# Patient Record
Sex: MALE | Race: WHITE | NOT HISPANIC OR LATINO | Employment: OTHER | URBAN - METROPOLITAN AREA
[De-identification: names, ages, dates, MRNs, and addresses within clinical notes are randomized per-mention and may not be internally consistent; named-entity substitution may affect disease eponyms.]

---

## 2020-11-23 ENCOUNTER — APPOINTMENT (OUTPATIENT)
Dept: CT IMAGING | Facility: CLINIC | Age: 85
End: 2020-11-23
Attending: EMERGENCY MEDICINE
Payer: MEDICARE

## 2020-11-23 ENCOUNTER — HOSPITAL ENCOUNTER (EMERGENCY)
Facility: CLINIC | Age: 85
Discharge: HOME | End: 2020-11-23
Attending: EMERGENCY MEDICINE | Admitting: EMERGENCY MEDICINE
Payer: MEDICARE

## 2020-11-23 VITALS
RESPIRATION RATE: 16 BRPM | TEMPERATURE: 97.5 F | OXYGEN SATURATION: 99 % | DIASTOLIC BLOOD PRESSURE: 77 MMHG | HEART RATE: 55 BPM | WEIGHT: 190 LBS | SYSTOLIC BLOOD PRESSURE: 156 MMHG

## 2020-11-23 DIAGNOSIS — R07.89 CHEST WALL PAIN: ICD-10-CM

## 2020-11-23 LAB
ANION GAP SERPL CALCULATED.3IONS-SCNC: 4 MMOL/L (ref 3–14)
BASOPHILS # BLD AUTO: 0 10E9/L (ref 0–0.2)
BASOPHILS NFR BLD AUTO: 0.4 %
BUN SERPL-MCNC: 23 MG/DL (ref 7–30)
CALCIUM SERPL-MCNC: 8.4 MG/DL (ref 8.5–10.1)
CHLORIDE SERPL-SCNC: 101 MMOL/L (ref 94–109)
CO2 SERPL-SCNC: 31 MMOL/L (ref 20–32)
CREAT SERPL-MCNC: 1.37 MG/DL (ref 0.66–1.25)
D DIMER PPP FEU-MCNC: 0.6 UG/ML FEU (ref 0–0.5)
DIFFERENTIAL METHOD BLD: ABNORMAL
EOSINOPHIL # BLD AUTO: 0.1 10E9/L (ref 0–0.7)
EOSINOPHIL NFR BLD AUTO: 2.7 %
ERYTHROCYTE [DISTWIDTH] IN BLOOD BY AUTOMATED COUNT: 12.6 % (ref 10–15)
GFR SERPL CREATININE-BSD FRML MDRD: 47 ML/MIN/{1.73_M2}
GLUCOSE SERPL-MCNC: 103 MG/DL (ref 70–99)
HCT VFR BLD AUTO: 35.8 % (ref 40–53)
HGB BLD-MCNC: 12.1 G/DL (ref 13.3–17.7)
IMM GRANULOCYTES # BLD: 0 10E9/L (ref 0–0.4)
IMM GRANULOCYTES NFR BLD: 0.4 %
INTERPRETATION ECG - MUSE: NORMAL
LYMPHOCYTES # BLD AUTO: 0.7 10E9/L (ref 0.8–5.3)
LYMPHOCYTES NFR BLD AUTO: 14.8 %
MCH RBC QN AUTO: 31.4 PG (ref 26.5–33)
MCHC RBC AUTO-ENTMCNC: 33.8 G/DL (ref 31.5–36.5)
MCV RBC AUTO: 93 FL (ref 78–100)
MONOCYTES # BLD AUTO: 0.5 10E9/L (ref 0–1.3)
MONOCYTES NFR BLD AUTO: 9.7 %
NEUTROPHILS # BLD AUTO: 3.5 10E9/L (ref 1.6–8.3)
NEUTROPHILS NFR BLD AUTO: 72 %
NRBC # BLD AUTO: 0 10*3/UL
NRBC BLD AUTO-RTO: 0 /100
PLATELET # BLD AUTO: 226 10E9/L (ref 150–450)
POTASSIUM SERPL-SCNC: 3.6 MMOL/L (ref 3.4–5.3)
RBC # BLD AUTO: 3.85 10E12/L (ref 4.4–5.9)
SODIUM SERPL-SCNC: 136 MMOL/L (ref 133–144)
TROPONIN I SERPL-MCNC: <0.015 UG/L (ref 0–0.04)
WBC # BLD AUTO: 4.9 10E9/L (ref 4–11)

## 2020-11-23 PROCEDURE — 250N000009 HC RX 250: Performed by: EMERGENCY MEDICINE

## 2020-11-23 PROCEDURE — 93005 ELECTROCARDIOGRAM TRACING: CPT

## 2020-11-23 PROCEDURE — 85379 FIBRIN DEGRADATION QUANT: CPT | Performed by: EMERGENCY MEDICINE

## 2020-11-23 PROCEDURE — 250N000011 HC RX IP 250 OP 636: Performed by: EMERGENCY MEDICINE

## 2020-11-23 PROCEDURE — 71275 CT ANGIOGRAPHY CHEST: CPT

## 2020-11-23 PROCEDURE — 99285 EMERGENCY DEPT VISIT HI MDM: CPT | Mod: 25

## 2020-11-23 PROCEDURE — 80048 BASIC METABOLIC PNL TOTAL CA: CPT | Performed by: EMERGENCY MEDICINE

## 2020-11-23 PROCEDURE — 85025 COMPLETE CBC W/AUTO DIFF WBC: CPT | Performed by: EMERGENCY MEDICINE

## 2020-11-23 PROCEDURE — 84484 ASSAY OF TROPONIN QUANT: CPT | Performed by: EMERGENCY MEDICINE

## 2020-11-23 RX ORDER — IOPAMIDOL 755 MG/ML
500 INJECTION, SOLUTION INTRAVASCULAR ONCE
Status: COMPLETED | OUTPATIENT
Start: 2020-11-23 | End: 2020-11-23

## 2020-11-23 RX ADMIN — IOPAMIDOL 65 ML: 755 INJECTION, SOLUTION INTRAVENOUS at 10:51

## 2020-11-23 RX ADMIN — SODIUM CHLORIDE 85 ML: 9 INJECTION, SOLUTION INTRAVENOUS at 10:51

## 2020-11-23 ASSESSMENT — ENCOUNTER SYMPTOMS
MYALGIAS: 0
FEVER: 0
BACK PAIN: 1
CHILLS: 0

## 2020-11-23 NOTE — ED AVS SNAPSHOT
[facility] Emergency Dept  [address]  [facility] [address]  Phone: [phone]  Fax: [phone]                                    Mark Salas   MRN: [id number]    Department: [facility] Emergency Dept   Date of Visit: 11/23/2020           After Visit Summary Signature Page    I have received my discharge instructions, and my questions have been answered. I have discussed any challenges I see with this plan with the nurse or doctor.    ..........................................................................................................................................  Patient/Patient Representative Signature      ..........................................................................................................................................  Patient Representative Print Name and Relationship to Patient    ..................................................               ................................................  Date                                   Time    ..........................................................................................................................................  Reviewed by Signature/Title    ...................................................              ..............................................  Date                                               Time          22EPIC Rev 08/18

## 2020-11-23 NOTE — ED TRIAGE NOTES
Patient initially reported body aches, now reports right sided chest pain that started a week ago. Also with arm and leg pain that started 2 weeks ago.

## 2020-11-23 NOTE — ED PROVIDER NOTES
History   Chief Complaint:  Chest and back pain     The history is provided by the patient.      Mark Salas is a 85 year old male who presents for evaluation of chest, right upper back, and elbow pain.  He notes that for at least 10 years he has had a vague right upper chest pain that he notices predominantly at night.  The pain is not changed with respiration, movement, cough, etc.  He does 40 push-ups at the end of the evening every night and goes to bed, and occasionally experiences 2-3 out of 10 pain at the site.  In the last week, he has had the pain migrated to his scapular area, to be worse approximately 4-5 out of 10.  It remains unchanged by movement or breathing.  This morning he noticed the pain radiating to his right elbow and family recommended he come in.  He denies fevers, chills, body aches, or any other concerns.    Allergies:  No Known Allergies    Medications:   Amlodipine  Lisinopril  Atenolol  Chlorthalidone   Ativan     Past Medical History:    Right knee degenerative joint disease   Hypertension  Chronic Kidney Disease   depression   Chronic insomnia   Malignant neoplasm of prostate   Primary osteoarthritis   Bilateral sensorineural hearing loss   Polymyalgia rheumatica     Past Surgical History:    Hydrocele excision   Skin biopsy    Family History:    Prostate cancer  Depression  Heart disease   Sudden cardiac death-brother 40s     Social History:  The patient was unaccompanied to the ED.  Smoking Status: Never Smoker  Smokeless Tobacco: Never Used  Alcohol Use: Yes    Review of Systems   Constitutional: Negative for chills and fever.   Cardiovascular: Positive for chest pain.   Musculoskeletal: Positive for back pain. Negative for myalgias.   All other systems reviewed and are negative.    Physical Exam     Patient Vitals for the past 24 hrs:   BP Temp Temp src Pulse Resp SpO2 Weight   11/23/20 1130 (!) 174/82 -- -- 56 -- 97 % --   11/23/20 1105 (!) 168/89 -- -- 63 -- 94 % --    11/23/20 1050 -- -- -- 54 -- 90 % --   11/23/20 1045 (!) 158/67 -- -- 59 -- 90 % --   11/23/20 1030 (!) 155/68 -- -- 60 -- 94 % --   11/23/20 1015 (!) 155/69 -- -- 59 14 95 % --   11/23/20 1000 (!) 156/73 -- -- 58 11 96 % --   11/23/20 0945 (!) 169/79 -- -- 63 -- 97 % --   11/23/20 0932 (!) 182/83 97.5  F (36.4  C) Oral 64 16 98 % 86.2 kg (190 lb)       Physical Exam  Constitutional:  Alert, attentive  HENT:    Nose: Nose normal.    Mouth/Throat: Oropharynx is clear, mucous membranes are moist  Eyes:    EOM are normal.  CV:    regular rate and rhythm; no murmurs, rubs or gallups. 2+ radial and ulnar pulses to the bilateral upper extremities   Chest:   Effort normal and breath sounds normal.   GI:     There is no tenderness. No distension. Normal bowel sounds  Neurological:  5/5 strength to the radian, ulnar and median motor distributions;      sensation intact to light touch to the radian, ulnar and median distributions  MSK:   Normal inspection to the right chest wall, back, and upper extremity; no swelling, erythema, warmth, or focal tenderness identified; normal range of motion  Skin:    Skin is warm and dry.       Emergency Department Course   ECG:  ECG taken at 0942, ECG read at 0944  Sinus bradycardia  Otherwise normal ECG  Rate 59 bpm. ID interval 182 ms. QRS duration 102 ms. QT/QTc 428/423 ms. P-R-T axes 63 54 65.    Imaging:  Radiology findings were communicated with the patient who voiced understanding of the findings.    CT Chest Pulmonary embolism w Contrast  IMPRESSION: No evidence for pulmonary embolism. No definite acute abnormality on CT.   Reading per radiology     Laboratory:  Laboratory findings were communicated with the patient who voiced understanding of the findings.    CBC: WBC 4.9, HGB 12.1(L),   BMP: glucose 103, Creatinine 1.37(H), GFR estimate 47(L), calcium 8.4(L) o/w WNL   Ddimer: 0.6(H)  Troponin (Collected 0955): <0.015     Emergency Department Course:  Nursing notes and  vitals reviewed.    0950 I performed an exam of the patient as documented above.     1108 I rechecked the patient. Explained findings to the Patient.    1231 I returned to update the patient.     Findings and plan explained to the Patient. Patient discharged home with instructions regarding supportive care, medications, and reasons to return. The importance of close follow-up was reviewed.     Impression & Plan      Medical Decision Making:  Mark Salas is a 85 year old male with longstanding history of right chest pain, worse in the last week, who presents for evaluation.  Physical exam reveals no significant musculoskeletal abnormality to explain symptoms there is no rash to suggest zoster.  Given longstanding and atypical pain, his negative EKG and troponin essentially rule out AMI.  D-dimer is elevated but CT chest shows no acute pathology.  There is no abdominal pain or nausea to suggest biliary colic and this is quite superior to right upper quadrant.  On recheck, the patient is resting comfortably.  Advise close follow-up given the unclear nature of his pain.  He does routinely do push-ups with this could represent pectoralis strain or pain.  Plan primary care follow-up in 2 to 3 days and strict return precautions for worse pain, shortness of breath, or any other concerns.    Diagnosis:    ICD-10-CM    1. Chest wall pain  R07.89        Disposition:   discharged to home    Scribe Disclosure:  I, Catina Dee, am serving as a scribe at 9:49 AM on 11/23/2020 to document services personally performed by Mark Lee MD based on my observations and the provider's statements to me.   [facility] EMERGENCY DEPARTMENT       Mark Lee MD  11/23/20 3974

## 2023-04-12 ENCOUNTER — APPOINTMENT (OUTPATIENT)
Dept: CT IMAGING | Facility: CLINIC | Age: 88
End: 2023-04-12
Attending: EMERGENCY MEDICINE
Payer: COMMERCIAL

## 2023-04-12 ENCOUNTER — HOSPITAL ENCOUNTER (EMERGENCY)
Facility: CLINIC | Age: 88
Discharge: HOME | End: 2023-04-12
Attending: EMERGENCY MEDICINE | Admitting: EMERGENCY MEDICINE
Payer: COMMERCIAL

## 2023-04-12 ENCOUNTER — APPOINTMENT (OUTPATIENT)
Dept: ULTRASOUND IMAGING | Facility: CLINIC | Age: 88
End: 2023-04-12
Attending: EMERGENCY MEDICINE
Payer: COMMERCIAL

## 2023-04-12 VITALS
RESPIRATION RATE: 23 BRPM | WEIGHT: 193.2 LBS | SYSTOLIC BLOOD PRESSURE: 131 MMHG | HEART RATE: 61 BPM | DIASTOLIC BLOOD PRESSURE: 69 MMHG | TEMPERATURE: 97.7 F | OXYGEN SATURATION: 96 %

## 2023-04-12 DIAGNOSIS — I48.91 ATRIAL FIBRILLATION, NEW ONSET (H): ICD-10-CM

## 2023-04-12 DIAGNOSIS — K86.2 PANCREATIC CYST: ICD-10-CM

## 2023-04-12 DIAGNOSIS — R10.13 ABDOMINAL PAIN, EPIGASTRIC: ICD-10-CM

## 2023-04-12 LAB
ALBUMIN SERPL BCG-MCNC: 4.1 G/DL (ref 3.5–5.2)
ALBUMIN UR-MCNC: 50 MG/DL
ALP SERPL-CCNC: 106 U/L (ref 40–129)
ALT SERPL W P-5'-P-CCNC: 42 U/L (ref 10–50)
ANION GAP SERPL CALCULATED.3IONS-SCNC: 12 MMOL/L (ref 7–15)
APPEARANCE UR: CLEAR
AST SERPL W P-5'-P-CCNC: 64 U/L (ref 10–50)
ATRIAL RATE - MUSE: 113 BPM
BASOPHILS # BLD AUTO: 0 10E3/UL (ref 0–0.2)
BASOPHILS NFR BLD AUTO: 0 %
BILIRUB DIRECT SERPL-MCNC: 0.2 MG/DL (ref 0–0.3)
BILIRUB SERPL-MCNC: 0.4 MG/DL
BILIRUB UR QL STRIP: NEGATIVE
BUN SERPL-MCNC: 43.6 MG/DL (ref 8–23)
CALCIUM SERPL-MCNC: 9.7 MG/DL (ref 8.8–10.2)
CHLORIDE SERPL-SCNC: 100 MMOL/L (ref 98–107)
COLOR UR AUTO: ABNORMAL
CREAT BLD-MCNC: 1.9 MG/DL (ref 0.7–1.3)
CREAT SERPL-MCNC: 1.73 MG/DL (ref 0.67–1.17)
DEPRECATED HCO3 PLAS-SCNC: 26 MMOL/L (ref 22–29)
DIASTOLIC BLOOD PRESSURE - MUSE: NORMAL MMHG
EOSINOPHIL # BLD AUTO: 0.1 10E3/UL (ref 0–0.7)
EOSINOPHIL NFR BLD AUTO: 1 %
ERYTHROCYTE [DISTWIDTH] IN BLOOD BY AUTOMATED COUNT: 12.8 % (ref 10–15)
GFR SERPL CREATININE-BSD FRML MDRD: 34 ML/MIN/1.73M2
GFR SERPL CREATININE-BSD FRML MDRD: 38 ML/MIN/1.73M2
GLUCOSE SERPL-MCNC: 177 MG/DL (ref 70–99)
GLUCOSE UR STRIP-MCNC: NEGATIVE MG/DL
HCT VFR BLD AUTO: 35.2 % (ref 40–53)
HGB BLD-MCNC: 12.1 G/DL (ref 13.3–17.7)
HGB UR QL STRIP: NEGATIVE
HOLD SPECIMEN: NORMAL
HOLD SPECIMEN: NORMAL
IMM GRANULOCYTES # BLD: 0.1 10E3/UL
IMM GRANULOCYTES NFR BLD: 1 %
INR PPP: 1.03 (ref 0.85–1.15)
INTERPRETATION ECG - MUSE: NORMAL
KETONES UR STRIP-MCNC: NEGATIVE MG/DL
LACTATE SERPL-SCNC: 1.3 MMOL/L (ref 0.7–2)
LEUKOCYTE ESTERASE UR QL STRIP: NEGATIVE
LIPASE SERPL-CCNC: 40 U/L (ref 13–60)
LYMPHOCYTES # BLD AUTO: 0.8 10E3/UL (ref 0.8–5.3)
LYMPHOCYTES NFR BLD AUTO: 8 %
MCH RBC QN AUTO: 31.6 PG (ref 26.5–33)
MCHC RBC AUTO-ENTMCNC: 34.4 G/DL (ref 31.5–36.5)
MCV RBC AUTO: 92 FL (ref 78–100)
MONOCYTES # BLD AUTO: 0.4 10E3/UL (ref 0–1.3)
MONOCYTES NFR BLD AUTO: 4 %
NEUTROPHILS # BLD AUTO: 9.1 10E3/UL (ref 1.6–8.3)
NEUTROPHILS NFR BLD AUTO: 86 %
NITRATE UR QL: NEGATIVE
NRBC # BLD AUTO: 0 10E3/UL
NRBC BLD AUTO-RTO: 0 /100
P AXIS - MUSE: NORMAL DEGREES
PH UR STRIP: 6.5 [PH] (ref 5–7)
PLATELET # BLD AUTO: 210 10E3/UL (ref 150–450)
POTASSIUM SERPL-SCNC: 3.6 MMOL/L (ref 3.4–5.3)
PR INTERVAL - MUSE: NORMAL MS
PROT SERPL-MCNC: 6.6 G/DL (ref 6.4–8.3)
QRS DURATION - MUSE: 110 MS
QT - MUSE: 418 MS
QTC - MUSE: 438 MS
R AXIS - MUSE: 16 DEGREES
RBC # BLD AUTO: 3.83 10E6/UL (ref 4.4–5.9)
RBC URINE: <1 /HPF
SODIUM SERPL-SCNC: 138 MMOL/L (ref 136–145)
SP GR UR STRIP: 1.01 (ref 1–1.03)
SYSTOLIC BLOOD PRESSURE - MUSE: NORMAL MMHG
T AXIS - MUSE: 43 DEGREES
TROPONIN T SERPL HS-MCNC: 62 NG/L
TROPONIN T SERPL HS-MCNC: 75 NG/L
UROBILINOGEN UR STRIP-MCNC: NORMAL MG/DL
VENTRICULAR RATE- MUSE: 66 BPM
WBC # BLD AUTO: 10.5 10E3/UL (ref 4–11)
WBC URINE: <1 /HPF

## 2023-04-12 PROCEDURE — 96374 THER/PROPH/DIAG INJ IV PUSH: CPT | Mod: 59

## 2023-04-12 PROCEDURE — 80053 COMPREHEN METABOLIC PANEL: CPT | Performed by: EMERGENCY MEDICINE

## 2023-04-12 PROCEDURE — 99285 EMERGENCY DEPT VISIT HI MDM: CPT | Mod: 25

## 2023-04-12 PROCEDURE — 82248 BILIRUBIN DIRECT: CPT | Performed by: EMERGENCY MEDICINE

## 2023-04-12 PROCEDURE — 250N000009 HC RX 250: Performed by: EMERGENCY MEDICINE

## 2023-04-12 PROCEDURE — 82310 ASSAY OF CALCIUM: CPT | Performed by: EMERGENCY MEDICINE

## 2023-04-12 PROCEDURE — 82565 ASSAY OF CREATININE: CPT

## 2023-04-12 PROCEDURE — 84484 ASSAY OF TROPONIN QUANT: CPT | Performed by: EMERGENCY MEDICINE

## 2023-04-12 PROCEDURE — 250N000013 HC RX MED GY IP 250 OP 250 PS 637: Performed by: EMERGENCY MEDICINE

## 2023-04-12 PROCEDURE — 93005 ELECTROCARDIOGRAM TRACING: CPT

## 2023-04-12 PROCEDURE — 36415 COLL VENOUS BLD VENIPUNCTURE: CPT | Performed by: EMERGENCY MEDICINE

## 2023-04-12 PROCEDURE — 250N000011 HC RX IP 250 OP 636: Performed by: EMERGENCY MEDICINE

## 2023-04-12 PROCEDURE — 74174 CTA ABD&PLVS W/CONTRAST: CPT

## 2023-04-12 PROCEDURE — 83690 ASSAY OF LIPASE: CPT | Performed by: EMERGENCY MEDICINE

## 2023-04-12 PROCEDURE — 81001 URINALYSIS AUTO W/SCOPE: CPT | Performed by: EMERGENCY MEDICINE

## 2023-04-12 PROCEDURE — 76705 ECHO EXAM OF ABDOMEN: CPT

## 2023-04-12 PROCEDURE — 83605 ASSAY OF LACTIC ACID: CPT | Performed by: EMERGENCY MEDICINE

## 2023-04-12 PROCEDURE — 96376 TX/PRO/DX INJ SAME DRUG ADON: CPT

## 2023-04-12 PROCEDURE — 85025 COMPLETE CBC W/AUTO DIFF WBC: CPT | Performed by: EMERGENCY MEDICINE

## 2023-04-12 PROCEDURE — 85610 PROTHROMBIN TIME: CPT | Performed by: EMERGENCY MEDICINE

## 2023-04-12 RX ORDER — HYDROCODONE BITARTRATE AND ACETAMINOPHEN 5; 325 MG/1; MG/1
1-2 TABLET ORAL EVERY 6 HOURS PRN
Qty: 8 TABLET | Refills: 0 | Status: SHIPPED | OUTPATIENT
Start: 2023-04-12 | End: 2023-04-15

## 2023-04-12 RX ORDER — MORPHINE SULFATE 4 MG/ML
4 INJECTION, SOLUTION INTRAMUSCULAR; INTRAVENOUS ONCE
Status: DISCONTINUED | OUTPATIENT
Start: 2023-04-12 | End: 2023-04-12

## 2023-04-12 RX ORDER — IOPAMIDOL 755 MG/ML
500 INJECTION, SOLUTION INTRAVASCULAR ONCE
Status: COMPLETED | OUTPATIENT
Start: 2023-04-12 | End: 2023-04-12

## 2023-04-12 RX ORDER — MORPHINE SULFATE 4 MG/ML
4 INJECTION, SOLUTION INTRAMUSCULAR; INTRAVENOUS
Status: DISCONTINUED | OUTPATIENT
Start: 2023-04-12 | End: 2023-04-12 | Stop reason: HOSPADM

## 2023-04-12 RX ADMIN — MORPHINE SULFATE 4 MG: 4 INJECTION, SOLUTION INTRAMUSCULAR; INTRAVENOUS at 17:49

## 2023-04-12 RX ADMIN — SODIUM CHLORIDE 80 ML: 9 INJECTION, SOLUTION INTRAVENOUS at 14:17

## 2023-04-12 RX ADMIN — ALUMINUM HYDROXIDE, MAGNESIUM HYDROXIDE, AND DIMETHICONE 30 ML: 200; 20; 200 SUSPENSION ORAL at 14:31

## 2023-04-12 RX ADMIN — MORPHINE SULFATE 4 MG: 4 INJECTION, SOLUTION INTRAMUSCULAR; INTRAVENOUS at 14:10

## 2023-04-12 RX ADMIN — IOPAMIDOL 80 ML: 755 INJECTION, SOLUTION INTRAVENOUS at 14:17

## 2023-04-12 ASSESSMENT — ENCOUNTER SYMPTOMS
PALPITATIONS: 0
SORE THROAT: 0
LIGHT-HEADEDNESS: 0
APPETITE CHANGE: 0
ABDOMINAL PAIN: 1
RHINORRHEA: 0
BLOOD IN STOOL: 0
DIZZINESS: 0
NAUSEA: 0
BACK PAIN: 0
COUGH: 0
VOMITING: 0
ARTHRALGIAS: 1
DYSURIA: 0
FEVER: 0

## 2023-04-12 ASSESSMENT — ACTIVITIES OF DAILY LIVING (ADL)
ADLS_ACUITY_SCORE: 35

## 2023-04-12 NOTE — ED PROVIDER NOTES
History     Chief Complaint:  Abdominal Pain and Irregular Heart Beat       HPI   Mark Salas is a 87 year old male with a history of HTN, prostate cancer, and renal failure who presents via EMS with upper abdominal pain beginning when he woke up this morning. Patient states the severity of the pain began as a 1/0 but has now worsened to a 6/10. The pain radiates to his lower abdomen as well. The pain does not radiate elsewhere. Denies new back pain, nausea, vomiting, appetite change, fevers, flu-like symptoms, leg swelling, chest pain, shortness of breath, palpitations, lightheadedness, dizziness, dysuria, and bloody stools. No recent changes in his bowel movements. Patient is not anticoagulated. He does not drink alcohol or smoke tobacco. No personal cardiac history. He has chronic joint pain and reports taking Tylenol regularly for this. Mark presented to urgent care this morning for his abdominal pain and was found to be in atrial fibrillation. He was referred to the ED for further evaluation.     Independent Historian:   None - Patient Only    Review of External Notes: Nephrology clinic office visit on 1/11/2023    ROS:  Review of Systems   Constitutional: Negative for appetite change and fever.   HENT: Negative for congestion, rhinorrhea and sore throat.    Respiratory: Negative for cough.    Cardiovascular: Negative for chest pain, palpitations and leg swelling.   Gastrointestinal: Positive for abdominal pain. Negative for blood in stool, nausea and vomiting.   Genitourinary: Negative for dysuria.   Musculoskeletal: Positive for arthralgias (chronic). Negative for back pain.   Neurological: Negative for dizziness and light-headedness.   All other systems reviewed and are negative.    Allergies:  Milk-Related Compounds     Medications:    Norvasc  Tenoretic   Zestril   Xyzal   Ativan   Flomax    Past Medical History:    Primary osteoarthritis of right knee  DJD of right knee  HTN  Depression   Skin  cancer  Polymyalgia rheumatica   CKD, stage 3  Prostate cancer  Bilateral sensorineural hearing loss   Insomnia   Enlarged prostate     Past Surgical History:    TKA, right  Vasectomy   Cataract removal with IOL implant, bilateral  Hydrocele excision    Family History:    Father: Prostate cancer, depression, heart disease  Brother: Depression     Social History:  Arrives alone via EMS  PCP: Akbar Prather MD, Internal Medicine     Physical Exam     Patient Vitals for the past 24 hrs:   BP Temp Temp src Pulse Resp SpO2 Weight   04/12/23 1536 -- -- -- 67 21 96 % --   04/12/23 1530 -- -- -- 64 21 93 % --   04/12/23 1521 -- -- -- 64 18 (!) 87 % --   04/12/23 1506 -- -- -- 60 17 91 % --   04/12/23 1451 -- -- -- 64 15 91 % --   04/12/23 1436 131/69 -- -- 67 23 93 % --   04/12/23 1247 135/82 -- -- -- -- -- --   04/12/23 1245 -- 97.7  F (36.5  C) Oral 64 14 97 % 87.6 kg (193 lb 3.2 oz)        Physical Exam  Constitutional: Well developed, uncomfortable nontox appearance  Head: Atraumatic.   Mouth/Throat: Oropharynx is clear and moist.   Neck:  no stridor  Eyes: no scleral icterus  Cardiovascular: Irregularly irregular rate and rhythm, 2+ bilat radial pulses  Pulmonary/Chest: nml resp effort, Clear BS bilat  Abdominal: ND, soft, epigastric and right upper quadrant abdominal tenderness, no rebound or guarding   : no CVA tenderness bilat  Ext: Warm, well perfused, no edema  Neurological: A&O, symmetric facies, moves ext x4  Skin: Skin is warm and dry.   Psychiatric: Behavior is normal. Thought content normal.   Nursing note and vitals reviewed.    Emergency Department Course   ECG  ECG taken at 1247, ECG read at 1252  Atrial fibrillation   Abnormal ECG   No significant change as compared to prior, dated 11/23/2020.  Rate 66 bpm. UT interval * ms. QRS duration 110 ms. QT/QTc 418/438 ms. P-R-T axes * 16 43.     Imaging:  Abdomen US, limited (RUQ only)   Final Result   IMPRESSION:   1.  Gallbladder is distended with no  evidence of stones. Gallbladder otherwise unremarkable.   2. Mild hepatic steatosis.   3. 1 cm cystic lesion noted within the pancreatic body. Please refer to recent CT for further information.      CTA Abdomen Pelvis with Contrast   Final Result   IMPRESSION:   1. The mesenteric arteries are patent without significant stenoses or   evidence for embolic occlusion.   2. Distended gallbladder. This is nonspecific. Further evaluation with   an ultrasound could be performed.   3. Cystic lesion in the body of the pancreas. Suggest nonemergent   gastroenterology consultation. Further evaluation with endoscopic   ultrasound could be performed.            JACOB PATRICK MD            SYSTEM ID:  [id number]         Report per radiology    Laboratory:  Labs Ordered and Resulted from Time of ED Arrival to Time of ED Departure   TROPONIN T, HIGH SENSITIVITY - Abnormal       Result Value    Troponin T, High Sensitivity 75 (*)    BASIC METABOLIC PANEL - Abnormal    Sodium 138      Potassium 3.6      Chloride 100      Carbon Dioxide (CO2) 26      Anion Gap 12      Urea Nitrogen 43.6 (*)     Creatinine 1.73 (*)     Calcium 9.7      Glucose 177 (*)     GFR Estimate 38 (*)    CBC WITH PLATELETS AND DIFFERENTIAL - Abnormal    WBC Count 10.5      RBC Count 3.83 (*)     Hemoglobin 12.1 (*)     Hematocrit 35.2 (*)     MCV 92      MCH 31.6      MCHC 34.4      RDW 12.8      Platelet Count 210      % Neutrophils 86      % Lymphocytes 8      % Monocytes 4      % Eosinophils 1      % Basophils 0      % Immature Granulocytes 1      NRBCs per 100 WBC 0      Absolute Neutrophils 9.1 (*)     Absolute Lymphocytes 0.8      Absolute Monocytes 0.4      Absolute Eosinophils 0.1      Absolute Basophils 0.0      Absolute Immature Granulocytes 0.1      Absolute NRBCs 0.0     HEPATIC FUNCTION PANEL - Abnormal    Protein Total 6.6      Albumin 4.1      Bilirubin Total 0.4      Alkaline Phosphatase 106      AST 64 (*)     ALT 42      Bilirubin Direct 0.20      ISTAT CREATININE POCT - Abnormal    Creatinine POCT 1.9 (*)     GFR, ESTIMATED POCT 34 (*)    TROPONIN T, HIGH SENSITIVITY - Abnormal    Troponin T, High Sensitivity 62 (*)    LIPASE - Normal    Lipase 40     INR - Normal    INR 1.03     LACTIC ACID WHOLE BLOOD - Normal    Lactic Acid 1.3          Procedures   None    Emergency Department Course & Assessments:       Interventions:  Medications   morphine (PF) injection 4 mg (4 mg Intravenous $Given 23 2950)   lidocaine (viscous) (XYLOCAINE) 2 % 15 mL, alum & mag hydroxide-simethicone (MAALOX) 15 mL GI Cocktail (30 mLs Oral $Given 23 1431)   iopamidol (ISOVUE-370) solution 500 mL (80 mLs Intravenous $Given 23 1417)   CT scan flush (80 mLs Intravenous $Given 23 1417)        Assessments:  1319 I obtained history and examined the patient as noted above.    Independent Interpretation (X-rays, CTs, rhythm strip):  None    Consultations/Discussion of Management or Tests:  None        Social Determinants of Health affecting care:   Age potentially increasing risk for presentation to the emergency department    Disposition:  Care of the patient was transferred to my colleague Dr. Vasquez pending US result and rpt troponin.     Impression & Plan    CMS Diagnoses: None    Medical Decision Makin year old male presenting w/ abdominal pain, atrial fibrillation    DDx includes arrhythmia, atrial fibrillation, thyroid dysfunction, electrolyte abnormality, CHF, PE, mesenteric ischemia, hepatitis, pancreatitis, gastritis.  EKG as noted above and significant for atrial fibrillation without acute ischemic findings on my independent interpretation.   Labs significant for elevated creatinine, elevated troponin, mild anemia, minimal AST elevation.  Imaging sig for gallbladder distention on CT otherwise no acute abnormality.  [id number] score is at least 3.  Outpatient prescriptions as noted below.  Patient signed out awaiting repeat troponin as well as  ultrasound result.  Should the patient's ultrasound return unremarkable and troponin stable with symptoms under control, the patient may be discharged with outpatient follow-up.      Diagnosis:    ICD-10-CM    1. Pancreatic cyst  K86.2       2. Abdominal pain, epigastric  R10.13       3. Atrial fibrillation, new onset (H)  I48.91            Discharge Medications:  New Prescriptions    No medications on file          Scribe Disclosure:  Kitty PATRICK, am serving as a scribe at 1:19 PM on 4/12/2023 to document services personally performed by Frederick Leung MD based on my observations and the provider's statements to me.   4/12/2023   Frederick Leung MD Vaughn, Christopher E, MD  04/12/23 1801

## 2023-04-13 NOTE — ED NOTES
Patient is endorsed to me at end of shift by Dr. Leung.  Patient presented with lower chest and epigastric pain and had extensive evaluation including cardiac work-up and CT of the chest abdomen pelvis and patient was noted to be in new onset A-fib.  Aorta.  There is no signs of mesenteric ischemia on the CT.  He did have a pancreatic cyst and distended gallbladder so a right upper quadrant ultrasound was done and pending at time of signout.  This shows a distended gallbladder without signs of cholecystitis or cholelithiasis.  He also had a slightly elevated troponin which did go down on repeat.  I reassessed the patient and he has no significant abdominal tenderness on my exam.  I discussed the options for admission for observation and symptomatic management versus discharge home with medications to try as well as primary care and GI follow-up.  After discussing these options, patient and wife would prefer to go home at this time.  I recommended prompt follow-up with primary care for his symptoms today as well as his pancreatic cyst and that they return to the ER for any worsening symptoms.     Khalif Vasquez MD  04/12/23 [id number]

## 2023-06-01 ENCOUNTER — HEALTH MAINTENANCE LETTER (OUTPATIENT)
Age: 88
End: 2023-06-01

## 2025-06-16 ENCOUNTER — HOSPITAL ENCOUNTER (EMERGENCY)
Facility: CLINIC | Age: 89
Discharge: HOME | End: 2025-06-16
Attending: EMERGENCY MEDICINE | Admitting: EMERGENCY MEDICINE
Payer: COMMERCIAL

## 2025-06-16 VITALS
HEIGHT: 71 IN | SYSTOLIC BLOOD PRESSURE: 134 MMHG | RESPIRATION RATE: 18 BRPM | TEMPERATURE: 97.8 F | HEART RATE: 74 BPM | DIASTOLIC BLOOD PRESSURE: 65 MMHG | WEIGHT: 185 LBS | OXYGEN SATURATION: 95 % | BODY MASS INDEX: 25.9 KG/M2

## 2025-06-16 DIAGNOSIS — S80.212A ABRASION OF LEFT KNEE, INITIAL ENCOUNTER: ICD-10-CM

## 2025-06-16 DIAGNOSIS — T14.8XXA MULTIPLE SKIN TEARS: ICD-10-CM
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
## 2025-06-16 DIAGNOSIS — Z79.01 CHRONIC ANTICOAGULATION: ICD-10-CM

## 2025-06-16 PROCEDURE — 12004 RPR S/N/AX/GEN/TRK7.6-12.5CM: CPT

## 2025-06-16 PROCEDURE — 99282 EMERGENCY DEPT VISIT SF MDM: CPT

## 2025-06-16 RX ORDER — LISINOPRIL 10 MG/1
10 TABLET ORAL DAILY
COMMUNITY

## 2025-06-16 RX ORDER — CHLORTHALIDONE 25 MG/1
25 TABLET ORAL DAILY
COMMUNITY

## 2025-06-16 RX ORDER — ATENOLOL AND CHLORTHALIDONE TABLET 50; 25 MG/1; MG/1
1 TABLET ORAL DAILY
COMMUNITY

## 2025-06-16 RX ORDER — AMLODIPINE BESYLATE 5 MG/1
5 TABLET ORAL DAILY
COMMUNITY

## 2025-06-16 RX ORDER — TAMSULOSIN HYDROCHLORIDE 0.4 MG/1
0.4 CAPSULE ORAL DAILY
COMMUNITY

## 2025-06-16 ASSESSMENT — COLUMBIA-SUICIDE SEVERITY RATING SCALE - C-SSRS
2. HAVE YOU ACTUALLY HAD ANY THOUGHTS OF KILLING YOURSELF IN THE PAST MONTH?: NO
1. IN THE PAST MONTH, HAVE YOU WISHED YOU WERE DEAD OR WISHED YOU COULD GO TO SLEEP AND NOT WAKE UP?: NO
6. HAVE YOU EVER DONE ANYTHING, STARTED TO DO ANYTHING, OR PREPARED TO DO ANYTHING TO END YOUR LIFE?: NO

## 2025-06-16 ASSESSMENT — ACTIVITIES OF DAILY LIVING (ADL): ADLS_ACUITY_SCORE: 41

## 2025-06-16 NOTE — ED TRIAGE NOTES
Patient presents to ER fell today ambulating w/o his cane crossing the street landing on asphalt landing on left side.  Denies hitting head.  Reports injury to left elbow and hand.  Left hand wrapped in triage.  CMS intact. Pt taking Eliquis.  States fell couple weeks ago playing pickle ball.

## 2025-06-16 NOTE — ED PROVIDER NOTES
"  Emergency Department Note      History of Present Illness     Chief Complaint   Fall    HPI   Mrak Salas is a 89 year old male with a history of hypertension, AFIB, chronic kidney disease, and depression who presents to the ED for a fall. The patient reports that he had a fall on his left side today while ambulating across a street without the cane he uses. The patient also notes that he had a previous fall a couple of weeks ago where he hit his head on a pickle ball paddle to break the fall. The patient reports that he had a parachute landing fall on both of these falls. The patient reports no noted injuries from the previous fall. However, the patient reports having superficial lacerations on both the left hand and elbow. The patient denies hip lacerations, hitting his head, and loss of consciousness. The patient's girlfriend reports that the patient has been less stable in the last few weeks.     Independent Historian   None    Review of External Notes   Care everywhere reviewed in epic updated    Past Medical History     Medical History and Problem List   Past Medical History:   Diagnosis Date    Atopic dermatitis     Chronic kidney disease     Depressive disorder     History of basal cell carcinoma     Hypertension     Osteoarthritis     Paroxysmal atrial fibrillation     PMR (polymyalgia rheumatica)     Prostate cancer     Tachy-rut syndrome        Medications   Prilosec   Tenormin  Hydrochlorothiazide  Eliquis  Amlodipine  Hygroton  Lisinopril  Tambocor  Metoprolol succinate  Lorazepam  Dupixent    Surgical History   Past Surgical History:   Procedure Laterality Date    BIOPSY SKIN (LOCATION)      IMPLANT PACEMAKER         Physical Exam     Patient Vitals for the past 24 hrs:   BP Temp Temp src Pulse Resp SpO2 Height Weight   06/16/25 1518 134/65 -- -- 74 18 95 % -- --   06/16/25 1226  158/80 97.8  F (36.6  C) Temporal 73 18 99 % 1.803 m (5' 11\") 83.9 kg (185 lb)     Physical Exam  Nursing note and " vitals reviewed.  Constitutional: Cooperative.  Comfortably walking around the room talking on the cell phone  HENT:   Mouth/Throat: Mucous membranes are normal.  Freely moving neck  Cardiovascular: Normal rate, regular rhythm and normal heart sounds.   Pulmonary/Chest: Effort normal and breath sounds normal. No respiratory distress.  Pacemaker noted  Abdominal: Normal appearance.  Nontender  Musculoskeletal: Normal range of motion of all extremities with close attention paid to the left upper extremity.   Neurological: Alert.  GCS 15.  Oriented x 3.  Strength and sensation normal in all extremities  Skin: Skin is warm and dry.  Left knee abrasion.  Multiple skin tears on the dorsum of the left hand.  Superficial skin tears to the left elbow and forearm  Psychiatric: Normal mood and affect.     Diagnostics     Lab Results   Labs Ordered and Resulted from Time of ED Arrival to Time of ED Departure - No data to display    Imaging   No orders to display     ED Course      Medications Administered   Medications - No data to display    Procedures   Procedures     Laceration Repair x3     Procedure: Laceration Repair    Indication: Lacerations    Consent: Verbal    Location: Left Hand  and Upper extremity     Lengths: 5, 3 and 1 cm    Preparation: Irrigation with Sterile Saline scrubbing with Shur-Clens.    Anesthesia/Sedation: None      Closure: The wounds were closed with sterile strips    Patient Status: The patient tolerated the procedure well: Yes. There were no complications.     Discussion of Management   None    ED Course   ED Course as of 06/16/25 1452   Mon Jun 16, 2025   1440 I obtained history and examined the patient as noted above.    1451 I performed a laceration repair procedure as noted above.        Additional Documentation  None    Medical Decision Making / Diagnosis     RASHEL Salas is a 89 year old male who presents after mechanical fall.  I am not concerned for an acute neurologic or  cardiogenic cause as he simply tripped having not been using his cane while going up over a curb.  Injuries localized to the left knee and upper extremity.  Based on clinical exam I am not concerned for fracture.  He did not hit his head.  No indication for advanced imaging at this time.  His wounds were repaired in the primary fashion as per the procedure note above.  Remainder of skin tears were cleaned and covered with bacitracin and sterile dressings.  He will follow-up with his regular physician as needed as well as in 1 week for wound checks    Disposition   The patient was discharged.     Diagnosis     ICD-10-CM    1. Fall, initial encounter  W19.XXXA       2. Chronic anticoagulation  Z79.01       3. Abrasion of left knee, initial encounter  S80.212A       4. Multiple skin tears - left hand and arm  T14.8XXA          Scribe Disclosure:  I, Triston Rawls, am serving as a scribe at 2:49 PM on 6/16/2025 to document services personally performed by Mark Jeffries MD based on my observations and the provider's statements to me.        Mark Jeffries MD  06/16/25 [id number]